# Patient Record
(demographics unavailable — no encounter records)

---

## 2024-12-16 NOTE — HISTORY OF PRESENT ILLNESS
[FreeTextEntry1] : This 27-year-old female  0 is here for a GYN follow-up examination.  She has been on birth control pills to regulate her menstrual cycles since she has polycystic ovarian syndrome.  She would like to continue with the birth control pill Junel FE 1.5/30.  Her last Pap test was normal 2024.

## 2024-12-16 NOTE — DISCUSSION/SUMMARY
[FreeTextEntry1] : Breast examination was normal bilaterally with no palpable masses and no nipple discharge.  Axillary exam was normal bilaterally.  External genitalia revealed no lesions.  Speculum exam revealed normal vaginal mucosa and cervix.  She will continue with her birth control pill and return to this office as needed or in 6 months for an annual exam and Pap test.  Patient agrees with the recommendations and the plan.

## 2025-05-09 NOTE — DISCUSSION/SUMMARY
[FreeTextEntry1] :  Urinary frequency- The pathophysiology of the above condition was discussed with the patient. The patient was given information and education on pelvic floor muscle exercises/rehabilitation, avoidance of dietary bladder irritants, and other strategies to improve bladder control, such as scheduled voiding. She was counseled regarding further management strategies for overactive bladder and urge incontinence including pelvic floor physical therapy, medications or surgical management. Discussed risk/benefit of anticholinergics versus beta 3 agonists. Patient does not want to use anticholinergic medications at this time because of the association with memory issues. The risks and benefits of gemtesa was reviewed.The patient voiced understanding and agrees with diet changes, referral to PT and starting gemtesa. We will follow up on her urine tests.  Myalgia- Discussed the pathophysiology of the above condition. Reviewed management options including medications (oral or vaginal suppository), injections, pelvic floor physical therapy, or referral for possible pudendal nerve blocks. The patient agrees to a referral to PT and medical management. The risks and benefits of tizanidine was reviewed.  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 Management options were reviewed, including oral medications (neurontin or elavil) or estrogen cream treatment. The risks and benefits of gabapentin was reviewed.

## 2025-05-09 NOTE — PHYSICAL EXAM
[MA] : MA [FreeTextEntry2] : A.Z. [FreeTextEntry1] : Void:  70cc PVR:  10cc Urethra was prepped in sterile fashion and then a sterile non- indwelling catheter (14F) was used by me to drain the bladder. The patient tolerated the procedure well. Indication: Urinary Frequency  normal perineal sensation normal perineal reflexes -cough stress test -atrophy +vestibular tenderness (reproducing the pins and needles in the vagina) -prolapse +fixed urethra bilateral levator ani spasm,  + tenderness (reproducing the pelvic pain) mild urethral tenderness -bladder tenderness -cervical tenderness 2/5 Kegel

## 2025-05-09 NOTE — COUNSELING
[FreeTextEntry1] :  We will notify you of the urine results if they are abnormal.  Please start drinking at least 4 cups of plain water per day  For 4-5 days, cut one item from the list out of your diet.   After 4-5 days, if it makes a difference, you have to decide if it is worth keeping it out of your diet to help with the urinary issues.   If it does not make a difference, you should add it back into your diet and remove another item for another 4-5 days.  Please start taking the gemtesa (bladder medication) every morning for urinary frequency  Please start taking the tizanidine 2mg (muscle relaxant) 1-2 tablets at night for the pelvic pain. It can make you sleepy  Please start taking the gabapentin (nerve pain medication) at night for the pins and needles in the vagina. It can make you sleepy  We don't expect any changes for at least 2 weeks, we see the full effect at 6 weeks. There are refills at the pharmacy.  Please call my office if you have any issues with the cost or side effects of the medication.  Schedule a 6 week med check with either my PA, Nga or my NP, Zeina (frequency/myalgia/vulvodynia)  Referral to pelvic floor physical therapy  [facility] [address] [place] [address]   [facility] [address] [address] Phone: [phone]  At home pelvic floor physical therapy: [email or web address]

## 2025-05-09 NOTE — REASON FOR VISIT
[TextEntry] : Reason for visit: New Patient Voids per day: 1-2 every hour   Voids per night: 2-3 Urge incontinence:  Rarely Stress incontinence: Rarely Constipation: No Fecal incontinence: No Vaginal bulge: No

## 2025-05-09 NOTE — HISTORY OF PRESENT ILLNESS
[FreeTextEntry1] : Pt with pelvic floor dysfunction here for urogynecologic evaluation. She describes:   Chief PFD:  urinary frequency and vaginal pain  Pelvic organ prolapse: hx of coarctation of aorta s/p repair, no bulge, denies splinting Stress urinary incontinence: denies  Overactive bladder syndrome: s/p oxybutynin 15mg for 6 months, helped with pressure, voids q1-2 hours secondary to urgency and pain that improves with urination, for the last 2 years, no glaucoma Voiding dysfunction: no Incomplete bladder emptying, n hesitancy  Lower urinary tract/vaginal symptoms: no recurrent UTIs per year, no hematuria, no dysuria, as below bladder pain  Fecal incontinence: denies Defecatory dysfunction: sausage Sexual dysfunction: not active secondary to pain deeper in Pelvic pain: lower quadrants, bloating pressure feeling, worse when she needs to urinate or have a bowel movement, improves with urination and tylenol, vaginal pins and needles constant, for years, worse with sex, her menses and a full bladder, improves with urination and tylenol, 6/10, today a 7/10 because she has her menses Vaginal dryness denies  Her pelvic floor symptoms are significantly bothersome and negatively impacting her quality of life.

## 2025-05-09 NOTE — END OF VISIT
[TextEntry] : 60m E&M, >50% spent in direct face to face counseling/coordination of care urinary frequency, myalgia, vulvodynia. This excludes cath. All questions answered. Patient reassured.

## 2025-06-20 NOTE — REASON FOR VISIT
[TextEntry] :  Reason for visit: 6 week med check. Voids per day:  10 Voids per night:  1-2 Urge incontinence : No Stress incontinence: no   Constipation: No   Fecal incontinence:  no   Vaginal bulge: no

## 2025-06-20 NOTE — HISTORY OF PRESENT ILLNESS
[FreeTextEntry1] : Today patient presents for 6 weeks med check follow-up visit. She has history of urinary frequency, myalgia and vulvodynia. She was last seen on 5/9/25 as new patient.  From initial visit:  Chief PFD: urinary frequency and vaginal pain  Pelvic organ prolapse: hx of coarctation of aorta s/p repair, no bulge, denies splinting Stress urinary incontinence: denies Overactive bladder syndrome: s/p oxybutynin 15mg for 6 months, helped with pressure, voids q1-2 hours secondary to urgency and pain that improves with urination, for the last 2 years, no glaucoma Voiding dysfunction: no Incomplete bladder emptying, n hesitancy Lower urinary tract/vaginal symptoms: no recurrent UTIs per year, no hematuria, no dysuria, as below bladder pain Fecal incontinence: denies Defecatory dysfunction: sausage Sexual dysfunction: not active secondary to pain deeper in Pelvic pain: lower quadrants, bloating pressure feeling, worse when she needs to urinate or have a bowel movement, improves with urination and tylenol, vaginal pins and needles constant, for years, worse with sex, her menses and a full bladder, improves with urination and tylenol, 6/10, today a 7/10 because she has her menses Vaginal dryness denies  Void: 70cc PVR: 10cc  Tizanidine and Gabapentin - took only for 1 week and topped due to feeling excessively sleepy PFPT - was not able to schedule yet Myrbetriq 25mg QD  Today, patient is doing well and has no complains. She has been taking Myrbetriq 25mg and noticed improvement of her symptoms. She denies symptoms of UTI, vaginal bleeding or pain.

## 2025-06-20 NOTE — COUNSELING
[FreeTextEntry1] : If you feel like you have an infection it is important for you to call our office and we will arrange testing of your urine.   Please continue taking Myrbetriq 25mg for frequency. Continue monitoring your blood pressure at home and call our office if it is elevated.   Please begin taking Flexeril 5 mg at bedtime. It can make you feel sleepy/drowsy. Please call my office if you have any issues with the cost or side effects of the medication.   Pelvic Floor Physical Therapy Locations in [place]: -[facility] [address] [place] [id number]  -[facility] [address] [place] [address] Phone: [phone]  At home pelvic floor physical therapy: [email or web address]   Schedule 3 month follow up med check appointment with TATE Pastrana, or sooner if you have any issues.

## 2025-06-20 NOTE — DISCUSSION/SUMMARY
[FreeTextEntry1] : Myalgia/Vulvodynia: -Rx Flexeril 5mg QHS sent to the pharmacy.  Risks and benefits of Flexeril discussed. Precautions given. -Advised to start PFPT. -Follow-up with OBGYN provider on 6/26 as scheduled.  Urinary frequency: She is taking Myrbetriq 25mg and would like to continue. Advised to continue monitoring her BP at home. Pt verbalized clear understanding and agreed.  All questions addressed. RTC in 3 mon for med check follow-up visit, or sooner PRN